# Patient Record
Sex: MALE | Race: WHITE | NOT HISPANIC OR LATINO | URBAN - METROPOLITAN AREA
[De-identification: names, ages, dates, MRNs, and addresses within clinical notes are randomized per-mention and may not be internally consistent; named-entity substitution may affect disease eponyms.]

---

## 2017-05-08 ENCOUNTER — APPOINTMENT (OUTPATIENT)
Dept: URBAN - METROPOLITAN AREA SURGERY 9 | Age: 57
Setting detail: DERMATOLOGY
End: 2017-05-08

## 2017-05-08 DIAGNOSIS — L60.8 OTHER NAIL DISORDERS: ICD-10-CM

## 2017-05-08 DIAGNOSIS — L85.3 XEROSIS CUTIS: ICD-10-CM

## 2017-05-08 DIAGNOSIS — L28.0 LICHEN SIMPLEX CHRONICUS: ICD-10-CM

## 2017-05-08 PROBLEM — D23.39 OTHER BENIGN NEOPLASM OF SKIN OF OTHER PARTS OF FACE: Status: ACTIVE | Noted: 2017-05-08

## 2017-05-08 PROCEDURE — OTHER TREATMENT REGIMEN: OTHER

## 2017-05-08 PROCEDURE — OTHER OBSERVATION: OTHER

## 2017-05-08 PROCEDURE — OTHER PRESCRIPTION: OTHER

## 2017-05-08 PROCEDURE — OTHER OBSERVATION AND MEASURE: OTHER

## 2017-05-08 PROCEDURE — OTHER OTHER: OTHER

## 2017-05-08 PROCEDURE — OTHER REASSURANCE: OTHER

## 2017-05-08 PROCEDURE — 99213 OFFICE O/P EST LOW 20 MIN: CPT

## 2017-05-08 PROCEDURE — OTHER COUNSELING: OTHER

## 2017-05-08 RX ORDER — FLUOCINONIDE 0.5 MG/G
CREAM TOPICAL
Qty: 1 | Refills: 0 | Status: ERX | COMMUNITY
Start: 2017-05-08

## 2017-05-08 ASSESSMENT — LOCATION DETAILED DESCRIPTION DERM
LOCATION DETAILED: LEFT THUMBNAIL
LOCATION DETAILED: RIGHT ANTERIOR SHOULDER
LOCATION DETAILED: RIGHT LATERAL ABDOMEN

## 2017-05-08 ASSESSMENT — LOCATION ZONE DERM
LOCATION ZONE: TRUNK
LOCATION ZONE: ARM
LOCATION ZONE: FINGERNAIL

## 2017-05-08 ASSESSMENT — LOCATION SIMPLE DESCRIPTION DERM
LOCATION SIMPLE: ABDOMEN
LOCATION SIMPLE: LEFT THUMB
LOCATION SIMPLE: RIGHT SHOULDER

## 2017-05-08 NOTE — PROCEDURE: OTHER
Detail Level: Simple
Note Text (......Xxx Chief Complaint.): This diagnosis correlates with the
Other (Free Text): Pt reports being unchanged for 20 years

## 2017-05-08 NOTE — PROCEDURE: OBSERVATION
Size Of Lesion: 1.5 mm wide
Detail Level: Detailed
Morphology Per Location (Optional): Very subtle, evenly light tan.  No pigment extending onto proximal nail fold.  See photo from 8/31/15.

## 2018-05-10 ENCOUNTER — APPOINTMENT (OUTPATIENT)
Dept: URBAN - METROPOLITAN AREA SURGERY 9 | Age: 58
Setting detail: DERMATOLOGY
End: 2018-05-11

## 2018-05-10 DIAGNOSIS — L81.4 OTHER MELANIN HYPERPIGMENTATION: ICD-10-CM

## 2018-05-10 DIAGNOSIS — L60.8 OTHER NAIL DISORDERS: ICD-10-CM

## 2018-05-10 DIAGNOSIS — L28.0 LICHEN SIMPLEX CHRONICUS: ICD-10-CM

## 2018-05-10 PROCEDURE — 99213 OFFICE O/P EST LOW 20 MIN: CPT

## 2018-05-10 PROCEDURE — OTHER OBSERVATION AND MEASURE: OTHER

## 2018-05-10 PROCEDURE — OTHER TREATMENT REGIMEN: OTHER

## 2018-05-10 PROCEDURE — OTHER REASSURANCE: OTHER

## 2018-05-10 PROCEDURE — OTHER OBSERVATION: OTHER

## 2018-05-10 ASSESSMENT — LOCATION DETAILED DESCRIPTION DERM
LOCATION DETAILED: RIGHT ANTERIOR SHOULDER
LOCATION DETAILED: LEFT THUMBNAIL
LOCATION DETAILED: LEFT SUPERIOR LATERAL MIDBACK

## 2018-05-10 ASSESSMENT — LOCATION ZONE DERM
LOCATION ZONE: ARM
LOCATION ZONE: FINGERNAIL
LOCATION ZONE: TRUNK

## 2018-05-10 ASSESSMENT — LOCATION SIMPLE DESCRIPTION DERM
LOCATION SIMPLE: RIGHT SHOULDER
LOCATION SIMPLE: LEFT THUMB
LOCATION SIMPLE: LEFT LOWER BACK

## 2018-05-10 NOTE — PROCEDURE: OBSERVATION
Detail Level: Detailed
Morphology Per Location (Optional): Very subtle, evenly light tan.  No pigment extending onto proximal nail fold.  See photo from 8/31/15.
Size Of Lesion: 1.5 mm wide